# Patient Record
Sex: FEMALE | Race: BLACK OR AFRICAN AMERICAN | NOT HISPANIC OR LATINO | Employment: OTHER | URBAN - METROPOLITAN AREA
[De-identification: names, ages, dates, MRNs, and addresses within clinical notes are randomized per-mention and may not be internally consistent; named-entity substitution may affect disease eponyms.]

---

## 2023-03-12 DIAGNOSIS — M25.561 ACUTE PAIN OF RIGHT KNEE: Primary | ICD-10-CM

## 2023-03-13 ENCOUNTER — HOSPITAL ENCOUNTER (OUTPATIENT)
Dept: RADIOLOGY | Facility: HOSPITAL | Age: 23
Discharge: HOME | End: 2023-03-13
Attending: ORTHOPAEDIC SURGERY
Payer: COMMERCIAL

## 2023-03-13 ENCOUNTER — OFFICE VISIT (OUTPATIENT)
Dept: ORTHOPEDICS | Facility: CLINIC | Age: 23
End: 2023-03-13
Payer: COMMERCIAL

## 2023-03-13 VITALS — WEIGHT: 145 LBS | BODY MASS INDEX: 21.48 KG/M2 | HEIGHT: 69 IN

## 2023-03-13 DIAGNOSIS — M25.461 EFFUSION OF RIGHT KNEE: ICD-10-CM

## 2023-03-13 DIAGNOSIS — S89.91XA INJURY OF RIGHT KNEE, INITIAL ENCOUNTER: Primary | ICD-10-CM

## 2023-03-13 DIAGNOSIS — M25.561 ACUTE PAIN OF RIGHT KNEE: ICD-10-CM

## 2023-03-13 PROCEDURE — 73564 X-RAY EXAM KNEE 4 OR MORE: CPT | Mod: TC,RT

## 2023-03-13 PROCEDURE — 73562 XR KNEE ORTHO RIGHT WITH FLEXION: ICD-10-PCS | Mod: 26,LT,, | Performed by: STUDENT IN AN ORGANIZED HEALTH CARE EDUCATION/TRAINING PROGRAM

## 2023-03-13 PROCEDURE — 73562 X-RAY EXAM OF KNEE 3: CPT | Mod: 26,LT,, | Performed by: STUDENT IN AN ORGANIZED HEALTH CARE EDUCATION/TRAINING PROGRAM

## 2023-03-13 PROCEDURE — 3008F BODY MASS INDEX DOCD: CPT | Mod: CPTII,S$GLB,, | Performed by: ORTHOPAEDIC SURGERY

## 2023-03-13 PROCEDURE — 99999 PR PBB SHADOW E&M-EST. PATIENT-LVL III: CPT | Mod: PBBFAC,,, | Performed by: ORTHOPAEDIC SURGERY

## 2023-03-13 PROCEDURE — 73564 XR KNEE ORTHO RIGHT WITH FLEXION: ICD-10-PCS | Mod: 26,RT,, | Performed by: STUDENT IN AN ORGANIZED HEALTH CARE EDUCATION/TRAINING PROGRAM

## 2023-03-13 PROCEDURE — 3008F PR BODY MASS INDEX (BMI) DOCUMENTED: ICD-10-PCS | Mod: CPTII,S$GLB,, | Performed by: ORTHOPAEDIC SURGERY

## 2023-03-13 PROCEDURE — 73564 X-RAY EXAM KNEE 4 OR MORE: CPT | Mod: 26,RT,, | Performed by: STUDENT IN AN ORGANIZED HEALTH CARE EDUCATION/TRAINING PROGRAM

## 2023-03-13 PROCEDURE — 99203 PR OFFICE/OUTPT VISIT, NEW, LEVL III, 30-44 MIN: ICD-10-PCS | Mod: S$GLB,,, | Performed by: ORTHOPAEDIC SURGERY

## 2023-03-13 PROCEDURE — 1159F PR MEDICATION LIST DOCUMENTED IN MEDICAL RECORD: ICD-10-PCS | Mod: CPTII,S$GLB,, | Performed by: ORTHOPAEDIC SURGERY

## 2023-03-13 PROCEDURE — 99203 OFFICE O/P NEW LOW 30 MIN: CPT | Mod: S$GLB,,, | Performed by: ORTHOPAEDIC SURGERY

## 2023-03-13 PROCEDURE — 1159F MED LIST DOCD IN RCRD: CPT | Mod: CPTII,S$GLB,, | Performed by: ORTHOPAEDIC SURGERY

## 2023-03-13 PROCEDURE — 99999 PR PBB SHADOW E&M-EST. PATIENT-LVL III: ICD-10-PCS | Mod: PBBFAC,,, | Performed by: ORTHOPAEDIC SURGERY

## 2023-03-13 NOTE — PROGRESS NOTES
Patient ID: Yasemin Steele  YOB: 2000  MRN: [id number]    Chief Complaint: Injury, Pain, and Swelling of the Right Knee      Referred By: [facility]    History of Present Illness: Yasemin Steele is a  23 y.o. 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 Her pain is currently at a 0/10. Her injury occurred when she was in a track meet 1 month ago and stepped wrong, causing her knee to pop. She has been seen by her team chiropractor, Dr. Vicente Calhoun, who gave her exercises to do. Otherwise she has no history of PT, CSIs, or sx. She reports weakness and swelling. Her pain is worsened by deep squats, running, and lunging. Her pain is alleviated by rest and ice.    HPI    Past Medical History:   History reviewed. No pertinent past medical history.  Past Surgical History:   Procedure Laterality Date    KNEE SURGERY Left      Family History   Problem Relation Age of Onset    Cancer Maternal Aunt     Diabetes Maternal Grandmother     Diabetes Maternal Grandfather     Diabetes Paternal Grandmother     Diabetes Paternal Grandfather      Social History     Socioeconomic History    Marital status: Single   Tobacco Use    Smoking status: Never    Smokeless tobacco: Never   Substance and Sexual Activity    Alcohol use: Not Currently    Drug use: Never       Review of patient's allergies indicates:  No Known Allergies  ROS    Physical Exam:   Body mass index is 21.41 kg/m².  There were no vitals filed for this visit.   GENERAL: Well appearing, appropriate for stated age, no acute distress.  CARDIOVASCULAR: Pulses regular by peripheral palpation.  PULMONARY: Respirations are even and non-labored.  NEURO: Awake, alert, and oriented x 3.  PSYCH: Mood & affect are appropriate.  HEENT: Head is normocephalic and atraumatic.  Ortho/SPM Exam  ***    Imaging:    No image  results found.    ***  Relevant imaging results reviewed and interpreted by me, discussed with the patient and / or family today. ***    Other Tests:     ***    There are no Patient Instructions on file for this visit.  Provider Note/Medical Decision Making: ***      I discussed worrisome and red flag signs and symptoms with the patient. The patient expressed understanding and agreed to alert me immediately or to go to the emergency room if they experience any of these.   Treatment plan was developed with input from the patient/family, and they expressed understanding and agreement with the plan. All questions were answered today.          Kyle Jimenes MD  Orthopaedic Surgery & Sports Medicine       Disclaimer: This note was prepared using a voice recognition system and is likely to have sound alike errors within the text.

## 2023-03-13 NOTE — PROGRESS NOTES
Patient ID: Yasemin Steele  YOB: 2000  MRN: [id number]    Chief Complaint: Injury, Pain, and Swelling of the Right Knee      Referred By: [facility]    History of Present Illness: Yasemin Steele is a  23 y.o. 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 Her pain is currently at a 0/10 at rest but moderate to severe with activity and palpation. Her injury occurred when she was in a track meet 1 month ago and stepped wrong, causing her knee to pop. She has been seen by her team chiropractor, Dr. Vicente Calhoun, who gave her exercises to do. Otherwise she has no history of PT, CSIs, or sx. She reports weakness and swelling. Her pain is worsened by deep squats, running, and lunging. Her pain is alleviated by rest and ice.    HPI    Past Medical History:   History reviewed. No pertinent past medical history.  Past Surgical History:   Procedure Laterality Date    KNEE SURGERY Left      Family History   Problem Relation Age of Onset    Cancer Maternal Aunt     Diabetes Maternal Grandmother     Diabetes Maternal Grandfather     Diabetes Paternal Grandmother     Diabetes Paternal Grandfather      Social History     Socioeconomic History    Marital status: Single   Tobacco Use    Smoking status: Never    Smokeless tobacco: Never   Substance and Sexual Activity    Alcohol use: Not Currently    Drug use: Never       Review of patient's allergies indicates:  No Known Allergies  ROS    Physical Exam:   Body mass index is 21.41 kg/m².  There were no vitals filed for this visit.   GENERAL: Well appearing, appropriate for stated age, no acute distress.  CARDIOVASCULAR: Pulses regular by peripheral palpation.  PULMONARY: Respirations are even and non-labored.  NEURO: Awake, alert, and oriented x 3.  PSYCH: Mood & affect are appropriate.  HEENT: Head  is normocephalic and atraumatic.            Right Knee Exam     Inspection   Effusion: present    Tenderness   The patient is tender to palpation of the lateral joint line, medial joint line and patellar tendon.    Range of Motion   Extension:  0   Flexion:  130     Tests   Meniscus   Alok:  Medial - negative   Ligament Examination   Lachman: normal (-1 to 2mm)   MCL - Valgus: normal (0 to 2mm)  LCL - Varus: normal  Patella   Patellar apprehension: negative    Other   Sensation: normal    Comments:  Intact EHL, FHL, gastrocsoleus, and tibialis anterior. Sensation intact to light touch in superficial peroneal, deep peroneal, tibial, sural, and saphenous nerve distributions. Foot warm and well perfused with capillary refill of less than 2 seconds and palpable pedal pulses.      Left Knee Exam     Tenderness   Left knee tenderness location: Medial Trochlea.    Muscle Strength   Right Lower Extremity   Hip Abduction: 5/5   Quadriceps:  5/5   Hamstrin/5     Vascular Exam     Right Pulses  Dorsalis Pedis:      2+  Posterior Tibial:      2+          Imaging:    X-ray Knee Ortho Right with Flexion  Narrative: EXAMINATION:  Four radiographic views of the RIGHT KNEE.    CLINICAL HISTORY:  Pain in right knee    TECHNIQUE:  4 radiographic views of the RIGHT KNEE.    COMPARISON:  None.    FINDINGS:  Four views of the right knee demonstrate normal alignment.  There is no fracture.  There is no bony mass lesion.  There is no joint effusion.  There is no soft tissue swelling.  Impression: No acute abnormality of the right knee.    Electronically signed by: Dewey Mariscal MD  Date:    2023  Time:    08:46      Relevant imaging results reviewed and interpreted by me, discussed with the patient and / or family today.     Other Tests:         Patient Instructions   Assessment:  Yasemin Steele is a  23 y.o. 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    Injury of right knee, initial encounter Yes    Effusion of right knee       Plan:  MRI of right knee  OTC anti-inflammatories as needed    Follow-up: AFTER MRI or sooner if there are any problems between now and then.    Leave Review:   Google: Leave Google Review  Healthgrades: Leave Healthgrades Review    After Hours Number: [phone]       Provider Note/Medical Decision Making: this patient has mechanical symptoms and  severe pain limiting her participation in athletics, despite activity modification. I recommend MRI of the right knee to further examine intra-articular structures.      I discussed worrisome and red flag signs and symptoms with the patient. The patient expressed understanding and agreed to alert me immediately or to go to the emergency room if they experience any of these.   Treatment plan was developed with input from the patient/family, and they expressed understanding and agreement with the plan. All questions were answered today.          Kyle Jimenes MD  Orthopaedic Surgery & Sports Medicine       Disclaimer: This note was prepared using a voice recognition system and is likely to have sound alike errors within the text.     I, Nidia Murphy, acted as a scribe for Kyle Jimenes MD for the duration of this office visit.

## 2023-03-13 NOTE — PATIENT INSTRUCTIONS
Assessment:  Yasemin Steele is a  23 y.o. 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    Injury of right knee, initial encounter Yes    Effusion of right knee       Plan:  MRI of right knee  OTC anti-inflammatories as needed    Follow-up: AFTER MRI or sooner if there are any problems between now and then.    Leave Review:   Google: Leave Google Review  Healthgrades: Leave Healthgrades Review    After Hours Number: [phone]

## 2023-03-14 ENCOUNTER — HOSPITAL ENCOUNTER (OUTPATIENT)
Dept: RADIOLOGY | Facility: HOSPITAL | Age: 23
Discharge: HOME | End: 2023-03-14
Attending: ORTHOPAEDIC SURGERY
Payer: COMMERCIAL

## 2023-03-14 DIAGNOSIS — M25.461 EFFUSION OF RIGHT KNEE: ICD-10-CM

## 2023-03-14 DIAGNOSIS — S89.91XA INJURY OF RIGHT KNEE, INITIAL ENCOUNTER: ICD-10-CM

## 2023-03-14 PROCEDURE — 73721 MRI KNEE WITHOUT CONTRAST RIGHT: ICD-10-PCS | Mod: 26,RT,, | Performed by: RADIOLOGY

## 2023-03-14 PROCEDURE — 73721 MRI JNT OF LWR EXTRE W/O DYE: CPT | Mod: 26,RT,, | Performed by: RADIOLOGY

## 2023-03-14 PROCEDURE — 73721 MRI JNT OF LWR EXTRE W/O DYE: CPT | Mod: TC,RT

## 2023-03-16 ENCOUNTER — OFFICE VISIT (OUTPATIENT)
Dept: ORTHOPEDICS | Facility: CLINIC | Age: 23
End: 2023-03-16
Payer: COMMERCIAL

## 2023-03-16 DIAGNOSIS — M23.90 DEGENERATIVE LESION OF ARTICULAR CARTILAGE OF KNEE: ICD-10-CM

## 2023-03-16 DIAGNOSIS — Q68.2 PATELLA ALTA: Primary | ICD-10-CM

## 2023-03-16 DIAGNOSIS — M22.8X1 MALTRACKING OF RIGHT PATELLA: ICD-10-CM

## 2023-03-16 PROCEDURE — 99999 PR PBB SHADOW E&M-EST. PATIENT-LVL III: CPT | Mod: PBBFAC,,, | Performed by: ORTHOPAEDIC SURGERY

## 2023-03-16 PROCEDURE — 99214 PR OFFICE/OUTPT VISIT, EST, LEVL IV, 30-39 MIN: ICD-10-PCS | Mod: S$GLB,,, | Performed by: ORTHOPAEDIC SURGERY

## 2023-03-16 PROCEDURE — 1159F PR MEDICATION LIST DOCUMENTED IN MEDICAL RECORD: ICD-10-PCS | Mod: CPTII,S$GLB,, | Performed by: ORTHOPAEDIC SURGERY

## 2023-03-16 PROCEDURE — 99999 PR PBB SHADOW E&M-EST. PATIENT-LVL III: ICD-10-PCS | Mod: PBBFAC,,, | Performed by: ORTHOPAEDIC SURGERY

## 2023-03-16 PROCEDURE — 99214 OFFICE O/P EST MOD 30 MIN: CPT | Mod: S$GLB,,, | Performed by: ORTHOPAEDIC SURGERY

## 2023-03-16 PROCEDURE — 1159F MED LIST DOCD IN RCRD: CPT | Mod: CPTII,S$GLB,, | Performed by: ORTHOPAEDIC SURGERY

## 2023-03-16 RX ORDER — IBUPROFEN 800 MG/1
800 TABLET ORAL 3 TIMES DAILY
Qty: 90 TABLET | Refills: 1 | Status: SHIPPED | OUTPATIENT
Start: 2023-03-16 | End: 2023-07-28

## 2023-03-16 NOTE — PROGRESS NOTES
Patient ID: Yasemin Steele  YOB: 2000  MRN: [id number]    Chief Complaint: Pain of the Right Knee    Referred By: current patient    History of Present Illness: Yasemin Steele is a  23 y.o. 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 She rates her pain as a 5/10 today. She is taking OTC NSAIDs for her pain. She is not currently in PT.    HPI    Past Medical History:   No past medical history on file.  Past Surgical History:   Procedure Laterality Date    KNEE SURGERY Left      Family History   Problem Relation Age of Onset    Cancer Maternal Aunt     Diabetes Maternal Grandmother     Diabetes Maternal Grandfather     Diabetes Paternal Grandmother     Diabetes Paternal Grandfather      Social History     Socioeconomic History    Marital status: Single   Tobacco Use    Smoking status: Never    Smokeless tobacco: Never   Substance and Sexual Activity    Alcohol use: Not Currently    Drug use: Never       Review of patient's allergies indicates:  No Known Allergies  ROS    Physical Exam:   There is no height or weight on file to calculate BMI.  There were no vitals filed for this visit.   GENERAL: Well appearing, appropriate for stated age, no acute distress.  CARDIOVASCULAR: Pulses regular by peripheral palpation.  PULMONARY: Respirations are even and non-labored.  NEURO: Awake, alert, and oriented x 3.  PSYCH: Mood & affect are appropriate.  HEENT: Head is normocephalic and atraumatic.  Ortho/SPM Exam  Right knee: no changes in exam. Continues to have significant pain over the patellofemoral joint, mostly medial, and including what appears to be a medial plica. She doesn't have significant patellofemoral instability, and is not tender over the MPFL femoral origin, with some minimal pain over the medial jointline. She has full rom and 5/5 strength.    Imaging:    MRI Knee Without  Contrast Right  Narrative: EXAMINATION:  MRI KNEE WITHOUT CONTRAST RIGHT    CLINICAL HISTORY:  Unspecified injury of right lower leg, initial encounterMeniscal tear, untreated, new symptoms;    TECHNIQUE:  Axial, sagittal and coronal images of the knee were obtained utilizing multiple pulse sequences without the use of IV contrast.    FINDINGS:  Medial meniscus: Intact.    Lateral meniscus: Intact.    Cruciate ligaments: Intact.    Collateral ligaments: Intact.    Patellar retinaculum/MPFL: Intact and without evidence of acute injury.    Patellar tendon and distal quadriceps tendon: Unremarkable.    Bones and cartilage: Lateral patellar tilt and subluxation.  Small full-thickness and partial-thickness chondral erosions of the lateral patellar facet and median patellar ridge with mild subchondral reactive marrow edema.  Tiny full-thickness chondral fissure of the lateral trochlear facet with subchondral marrow edema.  The remainder of the articular cartilage appears well preserved.  Questionable tiny nondisplaced subchondral fracture of the lateral trochlear facet (sagittal T1 image 19 series 6).  No other fracture.  No osteochondral defects or loose intra-articular osteochondral bodies.    Fluid: Large joint effusion.  Tiny Baker's cyst.  Impression: Full-thickness patellofemoral chondral defects with marrow edema.  Lateral patellar tilt and subluxation without evidence of MPFL tear.  Questionable tiny nondisplaced subchondral fracture lateral trochlear facet.  Large joint effusion.  Tiny Baker's cyst.  Otherwise unremarkable exam without evidence of meniscal tear.    Electronically signed by: Charly Ny MD  Date:    03/14/2023  Time:    12:05      Relevant imaging results reviewed and interpreted by me, discussed with the patient and / or family today.  The findings most consistent with her symptoms today include a high-grade and full-thickness chondrosis in the patellofemoral joint including the inferior  aspect of the patella.  She has quite significant bone marrow edema in both the patella and the trochlea.  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    Other Tests:         Patient Instructions   Assessment:  Yasemin Steele is a  23 y.o. 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    Patella mike Yes    Maltracking of right patella     Degenerative lesion of articular cartilage of knee         Plan:  Discussed pros and cons of conservative treatment include visco-supplementation with or without PRP  Patient understands PRP is not a covered by insurance   Discussed possible diagnostic arthroscopy with a cartilage restoration procedure in the future  We talked about Right knee CSI 40: will check to make sure the timing works with her competition guidelines.  Ibuprofen 800  Compound cream #2  Continue exercises, but add in hip and glute strengthening exercises    Follow-up: Tomorrow or sooner if there are any problems between now and then.    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  On physical exam she is very symptomatic in the patellofemoral region and also  over a medial plica region.  Considering she is in season and prior ties is not taking time off at this point her treatment options are somewhat limited.  I think there could be for role for a corticosteroid injection but we need to make sure that we are within the guidelines of her sport and fit with the timing of that and certainly this would not provide any long-lasting relief or any improvement in the cartilage structure and repeated corticosteroid injections can have negative effects on cartilage and bone health.  There may be a role for PRP although the literature is unproven on it and this would be useful mainly from an anti-inflammatory standpoint but I would not expect a quick result from it and she understands it is not covered by insurance and has not goal standard.  We also discussed the possibility of a knee arthroscopy plus or minus a cartilage restoration procedure at some point in the future.  She is not interested in that currently.  She is going to continue do her exercises and also add an hip include strengthening and I communicated with Dewey Lj who has been treating her and we will coordinate her care as well.      I discussed worrisome and red flag signs and symptoms with the patient. The patient expressed understanding and agreed to alert me immediately or to go to the emergency room if they experience any of these.   Treatment plan was developed with input from the patient/family, and they expressed understanding and agreement with the plan. All questions were answered today.          Kyle Jimenes MD  Or[facility]opa[facility] Surgery [facility] Sports Medicine       Disclaimer: This note was prepared using a voice recognition system and is likely to have sound alike errors within the text.

## 2023-03-16 NOTE — PATIENT INSTRUCTIONS
Assessment:  Yasemin Steele is a  23 y.o. 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    Patella mike Yes    Maltracking of right patella     Degenerative lesion of articular cartilage of knee         Plan:  Discussed pros and cons of conservative treatment include visco-supplementation with or without PRP  Patient understands PRP is not a covered by insurance   Discussed possible diagnostic arthroscopy with a cartilage restoration procedure in the future  We talked about Right knee CSI 40: will check to make sure the timing works with her competition guidelines.  Ibuprofen 800  Compound cream #2  Continue exercises, but add in hip and glute strengthening exercises    Follow-up: Tomorrow or sooner if there are any problems between now and then.    Leave Review:   Google: Leave Google Review  Healthgrades: Leave Healthgrades Review    After Hours Number: [phone]

## 2023-07-28 DIAGNOSIS — M22.8X1 MALTRACKING OF RIGHT PATELLA: ICD-10-CM

## 2023-07-28 DIAGNOSIS — M23.90 DEGENERATIVE LESION OF ARTICULAR CARTILAGE OF KNEE: ICD-10-CM

## 2023-07-28 RX ORDER — IBUPROFEN 800 MG/1
TABLET ORAL
Qty: 90 TABLET | Refills: 1 | Status: SHIPPED | OUTPATIENT
Start: 2023-07-28 | End: 2024-01-30